# Patient Record
Sex: MALE | Race: OTHER | NOT HISPANIC OR LATINO | URBAN - METROPOLITAN AREA
[De-identification: names, ages, dates, MRNs, and addresses within clinical notes are randomized per-mention and may not be internally consistent; named-entity substitution may affect disease eponyms.]

---

## 2019-11-17 ENCOUNTER — EMERGENCY (EMERGENCY)
Facility: HOSPITAL | Age: 59
LOS: 1 days | Discharge: HOME | End: 2019-11-17
Attending: EMERGENCY MEDICINE
Payer: MEDICAID

## 2019-11-17 VITALS
DIASTOLIC BLOOD PRESSURE: 101 MMHG | RESPIRATION RATE: 20 BRPM | OXYGEN SATURATION: 96 % | WEIGHT: 149.03 LBS | SYSTOLIC BLOOD PRESSURE: 152 MMHG | HEART RATE: 90 BPM | TEMPERATURE: 98 F | HEIGHT: 66 IN

## 2019-11-17 VITALS — DIASTOLIC BLOOD PRESSURE: 77 MMHG | HEART RATE: 71 BPM | SYSTOLIC BLOOD PRESSURE: 139 MMHG

## 2019-11-17 PROCEDURE — 99283 EMERGENCY DEPT VISIT LOW MDM: CPT

## 2019-11-17 RX ORDER — CIPROFLOXACIN HCL 0.3 %
2 DROPS OPHTHALMIC (EYE)
Qty: 1 | Refills: 0
Start: 2019-11-17 | End: 2019-11-21

## 2019-11-17 NOTE — ED PROVIDER NOTE - PATIENT PORTAL LINK FT
You can access the FollowMyHealth Patient Portal offered by [facility] by registering at the following website: [email or web address]. By joining [email or web address]’s FollowMyHealth portal, you will also be able to view your health information using other applications (apps) compatible with our system.

## 2019-11-17 NOTE — ED PROVIDER NOTE - OBJECTIVE STATEMENT
60 y/o M pt with a PMHx of DM and no significant PSHx presents to ED c/o L eye pain & tearing w/opening his eye since he woke up this morning. Pt reports that he had no complaints when he went to sleep last night nor does he have any known trauma. Pt states that he has an Ophthalmologist who has told him he has dry eyes. Pt denies any other acute complaints. NKDA.

## 2021-09-02 NOTE — ED PROVIDER NOTE - CROS ED ROS STATEMENT
1 year CY, last Flexible Sigmoidoscopy done 07/22/2021.    Recall added.    Thank you  
all other ROS negative except as per HPI

## 2025-05-14 NOTE — ED PROVIDER NOTE - NO SIGNIFICANT PAST SURGICAL HISTORY
Addended by: ELVIS LINDSAY on: 5/14/2025 11:12 AM     Modules accepted: Orders     <<----- Click to add NO significant Past Surgical History